# Patient Record
Sex: FEMALE | Race: WHITE | NOT HISPANIC OR LATINO | URBAN - METROPOLITAN AREA
[De-identification: names, ages, dates, MRNs, and addresses within clinical notes are randomized per-mention and may not be internally consistent; named-entity substitution may affect disease eponyms.]

---

## 2020-09-23 ENCOUNTER — OFFICE (OUTPATIENT)
Dept: URBAN - METROPOLITAN AREA PATHOLOGY 22 | Facility: PATHOLOGY | Age: 60
End: 2020-09-23
Payer: COMMERCIAL

## 2020-09-23 ENCOUNTER — AMBULATORY SURGICAL CENTER (OUTPATIENT)
Dept: URBAN - METROPOLITAN AREA SURGERY 1 | Facility: SURGERY | Age: 60
End: 2020-09-23
Payer: COMMERCIAL

## 2020-09-23 VITALS
DIASTOLIC BLOOD PRESSURE: 48 MMHG | SYSTOLIC BLOOD PRESSURE: 108 MMHG | SYSTOLIC BLOOD PRESSURE: 118 MMHG | OXYGEN SATURATION: 97 % | HEART RATE: 72 BPM | TEMPERATURE: 98.3 F | HEIGHT: 68 IN | HEART RATE: 78 BPM | OXYGEN SATURATION: 98 % | HEART RATE: 78 BPM | DIASTOLIC BLOOD PRESSURE: 86 MMHG | SYSTOLIC BLOOD PRESSURE: 118 MMHG | DIASTOLIC BLOOD PRESSURE: 89 MMHG | SYSTOLIC BLOOD PRESSURE: 108 MMHG | TEMPERATURE: 97.8 F | TEMPERATURE: 97.8 F | DIASTOLIC BLOOD PRESSURE: 86 MMHG | SYSTOLIC BLOOD PRESSURE: 108 MMHG | DIASTOLIC BLOOD PRESSURE: 86 MMHG | OXYGEN SATURATION: 96 % | OXYGEN SATURATION: 96 % | OXYGEN SATURATION: 98 % | SYSTOLIC BLOOD PRESSURE: 81 MMHG | DIASTOLIC BLOOD PRESSURE: 74 MMHG | HEART RATE: 82 BPM | HEART RATE: 91 BPM | SYSTOLIC BLOOD PRESSURE: 102 MMHG | HEIGHT: 68 IN | DIASTOLIC BLOOD PRESSURE: 89 MMHG | RESPIRATION RATE: 19 BRPM | RESPIRATION RATE: 19 BRPM | HEART RATE: 75 BPM | OXYGEN SATURATION: 97 % | DIASTOLIC BLOOD PRESSURE: 48 MMHG | SYSTOLIC BLOOD PRESSURE: 87 MMHG | HEART RATE: 91 BPM | OXYGEN SATURATION: 96 % | SYSTOLIC BLOOD PRESSURE: 87 MMHG | DIASTOLIC BLOOD PRESSURE: 74 MMHG | SYSTOLIC BLOOD PRESSURE: 87 MMHG | DIASTOLIC BLOOD PRESSURE: 48 MMHG | SYSTOLIC BLOOD PRESSURE: 131 MMHG | SYSTOLIC BLOOD PRESSURE: 81 MMHG | SYSTOLIC BLOOD PRESSURE: 131 MMHG | SYSTOLIC BLOOD PRESSURE: 131 MMHG | WEIGHT: 150 LBS | HEART RATE: 91 BPM | SYSTOLIC BLOOD PRESSURE: 118 MMHG | SYSTOLIC BLOOD PRESSURE: 102 MMHG | DIASTOLIC BLOOD PRESSURE: 78 MMHG | HEART RATE: 82 BPM | OXYGEN SATURATION: 97 % | RESPIRATION RATE: 18 BRPM | RESPIRATION RATE: 18 BRPM | TEMPERATURE: 98.3 F | DIASTOLIC BLOOD PRESSURE: 51 MMHG | HEART RATE: 69 BPM | DIASTOLIC BLOOD PRESSURE: 89 MMHG | TEMPERATURE: 97.8 F | HEART RATE: 97 BPM | HEART RATE: 72 BPM | HEIGHT: 68 IN | TEMPERATURE: 98.3 F | DIASTOLIC BLOOD PRESSURE: 78 MMHG | HEART RATE: 69 BPM | WEIGHT: 150 LBS | DIASTOLIC BLOOD PRESSURE: 51 MMHG | RESPIRATION RATE: 19 BRPM | HEART RATE: 72 BPM | OXYGEN SATURATION: 98 % | SYSTOLIC BLOOD PRESSURE: 81 MMHG | DIASTOLIC BLOOD PRESSURE: 78 MMHG | SYSTOLIC BLOOD PRESSURE: 102 MMHG | HEART RATE: 82 BPM | HEART RATE: 75 BPM | HEART RATE: 69 BPM | WEIGHT: 150 LBS | DIASTOLIC BLOOD PRESSURE: 51 MMHG | HEART RATE: 97 BPM | HEART RATE: 97 BPM | HEART RATE: 78 BPM | DIASTOLIC BLOOD PRESSURE: 74 MMHG | RESPIRATION RATE: 18 BRPM | HEART RATE: 75 BPM

## 2020-09-23 DIAGNOSIS — K62.89 OTHER SPECIFIED DISEASES OF ANUS AND RECTUM: ICD-10-CM

## 2020-09-23 DIAGNOSIS — Z12.11 ENCOUNTER FOR SCREENING FOR MALIGNANT NEOPLASM OF COLON: ICD-10-CM

## 2020-09-23 DIAGNOSIS — D12.3 BENIGN NEOPLASM OF TRANSVERSE COLON: ICD-10-CM

## 2020-09-23 PROBLEM — K63.5 POLYP OF COLON: Status: ACTIVE | Noted: 2020-09-23

## 2020-09-23 PROCEDURE — 88305 TISSUE EXAM BY PATHOLOGIST: CPT | Performed by: INTERNAL MEDICINE

## 2020-09-23 PROCEDURE — 45385 COLONOSCOPY W/LESION REMOVAL: CPT | Mod: 33 | Performed by: INTERNAL MEDICINE

## 2020-09-23 PROCEDURE — 45385 COLONOSCOPY W/LESION REMOVAL: CPT | Performed by: INTERNAL MEDICINE

## 2020-09-23 NOTE — SERVICENOTES
Start time: 1009
Cecum: 1013
TI intubation:yes 
End time:1022

[place] Bowel Prep Score:	 9 
-right colon:		 3
-transverse:          		 3
-left colon:                               3

## 2020-09-23 NOTE — SERVICENOTES
Start time: 1009
Cecum: 1013
TI intubation:yes 
End time:1022

[place] Bowel Prep Score:	 9 
-right colon:		 3
-transverse:          		 3
-left colon:                               3

## 2020-09-23 NOTE — SERVICENOTES
Start time: 1009
Cecum: 1013
TI intubation:yes 
End time:1022

[place] Bowel Prep Score:	 9 
-right colon:		 3
-transverse:          		 3
-left colon:                               3